# Patient Record
Sex: MALE | Race: WHITE
[De-identification: names, ages, dates, MRNs, and addresses within clinical notes are randomized per-mention and may not be internally consistent; named-entity substitution may affect disease eponyms.]

---

## 2020-05-26 ENCOUNTER — HOSPITAL ENCOUNTER (EMERGENCY)
Dept: HOSPITAL 53 - M ED | Age: 25
Discharge: HOME | End: 2020-05-26
Payer: COMMERCIAL

## 2020-05-26 VITALS — HEIGHT: 70 IN | BODY MASS INDEX: 26.54 KG/M2 | WEIGHT: 185.39 LBS

## 2020-05-26 VITALS — SYSTOLIC BLOOD PRESSURE: 137 MMHG | DIASTOLIC BLOOD PRESSURE: 74 MMHG
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
## 2020-05-26 DIAGNOSIS — F17.220: ICD-10-CM

## 2020-05-26 DIAGNOSIS — S68.625A: Primary | ICD-10-CM

## 2020-05-26 PROCEDURE — 96365 THER/PROPH/DIAG IV INF INIT: CPT

## 2020-05-26 PROCEDURE — 96375 TX/PRO/DX INJ NEW DRUG ADDON: CPT

## 2020-05-26 PROCEDURE — 12001 RPR S/N/AX/GEN/TRNK 2.5CM/<: CPT

## 2020-05-26 PROCEDURE — 99284 EMERGENCY DEPT VISIT MOD MDM: CPT

## 2020-05-26 PROCEDURE — 73140 X-RAY EXAM OF FINGER(S): CPT

## 2020-05-27 NOTE — REP
RING FINGER SERIES, FOUR VIEWS:

 

HISTORY:  Finger amputation.  Check bony involvement.

 

FINDINGS:  Four views of the left ring finger demonstrate a comminuted open,

somewhat displaced fracture of the distal tuft of the ring finger.  Distal tuft

fracture fragments are somewhat displaced in a radial and palmar direction.  No

opaque foreign body is seen.  Overlying dressing is noted.

 

IMPRESSION:

 

Open comminuted, somewhat displaced crush-type fracture distal tuft ring finger.

 

 

 

 

 

Electronically Signed by

Palmer Jacobson MD 05/27/2020 08:23 A

## 2020-09-25 ENCOUNTER — HOSPITAL ENCOUNTER (EMERGENCY)
Dept: HOSPITAL 53 - M ED | Age: 25
Discharge: HOME | End: 2020-09-25
Payer: COMMERCIAL

## 2020-09-25 VITALS — SYSTOLIC BLOOD PRESSURE: 146 MMHG | DIASTOLIC BLOOD PRESSURE: 67 MMHG

## 2020-09-25 VITALS — HEIGHT: 69 IN | WEIGHT: 184.39 LBS | BODY MASS INDEX: 27.31 KG/M2
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
## 2020-09-25 DIAGNOSIS — F17.220: ICD-10-CM
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
## 2020-09-25 DIAGNOSIS — S71.112A: Primary | ICD-10-CM
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
## 2020-10-02 NOTE — ER
DATE OF CONSULTATION: 09/25/2020



REASON FOR CONSULTATION:  Laceration left thigh.



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
was trying to cut a zip-tie with a knife shortly before presentation to the 
emergency department. Apparently the knife slipped or was otherwise deflected 
and he accidentally stabbed himself in his anteromedial left thigh. The patient 
describes the knife as having a 4 inch blade and reports that when he took the 
knife back out it was bloody about longterm up the blade. He had some immediate 
bleeding which was controlled with direct pressure and he was brought to the 
emergency department for evaluation. In the emergency department the wound was 
cleaned and the physicians assistant was preparing to close it but on two 
occasions the patient got out of bed and it was reported that he had blood 
spurting from the wound. I was asked to evaluate the patient regarding the need 
for further evaluation or treatment.



MEDICAL HISTORY:  The patient has no significant active medical problems.



SURGICAL HISTORY:  Significant for some previous elective surgery on his left 
leg.



FAMILY HISTORY:  Noncontributory.



REVIEW OF SYSTEMS: Reveals no history of bleeding diathesis. He was ambulatory 
on the leg after the injury. He denies any significant chest pain, shortness of 
breath or abdominal discomfort.



ALLERGIES: NONE KNOWN.



MEDICATIONS: On no routine home medications.



PHYSICAL EXAMINATION: The patient is a pleasant young man lying quietly on the 
emergency department stretcher. Examination is limited to the left lower 
extremity. He has a longitudinal laceration in the anteromedial aspect of the 
left distal thigh. This is approximately at the juncture of the mid and distal 
thirds of the thigh. This is approximately 2.5 to perhaps 3 cm in length and in 
the center of the wound the skin edges are  about 1 cm. There is no 
ongoing bleeding noted at this time. The wound appears to be a clean laceration.
He has palpable popliteal pulse on the left and palpable pedal pulses on both 
sides that are equal. I gently  the wound edges with Q-tips which he 
described as being painful. There appears to be some either old blood or 
hemorrhage within the underlying muscle in the depths of the wound about 1.5 to 
2 cm. No bleeding started during the exam.



IMPRESSION: Laceration left distal thigh. He described the injury for me and 
indicates that the blade would have entered the left thigh somewhat from the 
right. There is no evident bleeding currently. The blade presumably did 
penetrate the muscle fascia and he likely has a laceration of small portion of 
the muscle. There is no sign of significant venous injury and the trajectory and
location of the wound would suggest that an arterial injury is extremely 
unlikely. He has strong palpable pulses and no bleeding currently.



RECOMMENDATIONS: At this point I spoke with Mirian, who has been caring for him
in the emergency department. We had discussed the possibility of a CT scan of 
the thigh which I suspect will show some hemorrhage within the muscle of his 
distal thigh. I do not believe this will change his care. He is not bleeding now
and I suspect the blood that was present at the time he was up was merely blood 
that was being released from the muscular bleeding as he moved. I think it would
be reasonable to just close the wound and have him limit his activity as 
necessary for a time to allow this to heal. There is certainly a small risk of a
wound infection but at this point the visible portions of the wound are clean. 
He could certainly follow up as needed in the emergency department if there were
any signs of infection.



DIOGENES

## 2020-11-16 ENCOUNTER — HOSPITAL ENCOUNTER (OUTPATIENT)
Dept: HOSPITAL 53 - M RAD | Age: 25
End: 2020-11-16
Attending: SPECIALIST
Payer: COMMERCIAL

## 2020-11-16 DIAGNOSIS — R91.8: Primary | ICD-10-CM

## 2020-11-18 NOTE — REP
INDICATION:

OTHER NON SPECIFIC ABNORMAL FINDING OF LUNG FIELD



COMPARISON:

04/09/2020



TECHNIQUE:

Axial noncontrast images from the thoracic inlet to the upper abdomen with coronal and

sagittal reformations.



This CT examination was performed using the following dose reduction techniques:

Automated exposure control, adjustment of mA and/or kv according to the patient's

size, and use of iterative reconstruction technique.



FINDINGS:

The bilateral lung fields are well aerated.  Scattered calcified granulomata and

calcified lymph nodes are again identified and unchanged.  Small zone of subpleural

scarring in the posterior left lower lobe is again appreciated and appears less

pronounced than prior examination.  No acute consolidation, suspicious nodule or mass.

No effusion.  No pneumothorax.  Tracheobronchial tree is patent.  No acute adenopathy.

Further evaluation of the mediastinum demonstrates normal thoracic aorta, pulmonary

vasculature, and heart/pericardium.  Surrounding musculoskeletal structures are intact.



IMPRESSION:

1. Stable chronic granulomatous disease.

2. Small area of subpleural scarring in the posterior left lower lobe again noted and

less pronounced than prior examination.

3. No acute significant mediastinal or pleuroparenchymal process appreciated.





<Electronically signed by Capo Michele > 11/18/20 [id number]

## 2021-07-21 ENCOUNTER — HOSPITAL ENCOUNTER (OUTPATIENT)
Dept: HOSPITAL 53 - M PLAIMG | Age: 26
End: 2021-07-21
Attending: INTERNAL MEDICINE
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
## 2021-07-21 DIAGNOSIS — S49.91XA: Primary | ICD-10-CM

## 2021-07-21 NOTE — REP
INDICATION:

INJURY TO SHOULDER.



COMPARISON:

None.



TECHNIQUE:

Three views of the right shoulder are provided.



FINDINGS:

The right glenohumeral and acromioclavicular joints are normally aligned.

Periarticular soft tissues are unremarkable.  There is a small bone island in the

humeral head noted incidentally and a granulomatous calcification is seen in the right

lung apex.  Periarticular soft tissues are unremarkable.



IMPRESSION:

Negative views of the right shoulder.





<Electronically signed by Luis Manuel Jacobson > 07/21/21 [id number]

## 2021-07-21 NOTE — REP
INDICATION:

INJURY TO FEMUR.



COMPARISON:

Comparison chest CT images November 16, 2020.



TECHNIQUE:

Two views..



FINDINGS:

The lungs are well inflated and free of infiltrate.  The pleural angles are sharp.

The heart size is normal.  Pulmonary vasculature is not increased.  No significant

bony abnormality is seen.  There is a granulomatous calcification in the right lung

apex unchanged from the comparison CT study.



IMPRESSION:

No active disease..





<Electronically signed by Luis Manuel Jacobson > 07/21/21 [id number]

## 2021-07-21 NOTE — REP
INDICATION:

SOB



COMPARISON:

None.



TECHNIQUE:

AP and lateral left femur.



FINDINGS:

There is no evidence of acute fracture, dislocation, or intrinsic bone disease.There

is intramedullary rick fixed by screws in the shaft of the left femur.  No abnormal

lucency is seen at the interface with adjacent bone.



IMPRESSION:

No fracture or dislocation. Metallic internal fixation.





<Electronically signed by Delfino Sanchez > 07/21/21 [id number]